# Patient Record
Sex: FEMALE | Race: WHITE | NOT HISPANIC OR LATINO | Employment: FULL TIME | URBAN - METROPOLITAN AREA
[De-identification: names, ages, dates, MRNs, and addresses within clinical notes are randomized per-mention and may not be internally consistent; named-entity substitution may affect disease eponyms.]

---

## 2023-08-15 LAB
ESTRADIOL (PG/ML) IN SER/PLAS: 22 PG/ML
FOLLITROPIN (IU/L) IN SER/PLAS: 47.3 IU/L

## 2024-10-01 ENCOUNTER — HOSPITAL ENCOUNTER (OUTPATIENT)
Dept: RADIOLOGY | Facility: EXTERNAL LOCATION | Age: 54
Discharge: HOME | End: 2024-10-01

## 2024-10-01 ENCOUNTER — HOSPITAL ENCOUNTER (OUTPATIENT)
Dept: RADIOLOGY | Facility: CLINIC | Age: 54
Discharge: HOME | End: 2024-10-01
Payer: COMMERCIAL

## 2024-10-01 ENCOUNTER — OFFICE VISIT (OUTPATIENT)
Dept: ORTHOPEDIC SURGERY | Facility: CLINIC | Age: 54
End: 2024-10-01
Payer: COMMERCIAL

## 2024-10-01 VITALS — WEIGHT: 252 LBS | HEIGHT: 62 IN | BODY MASS INDEX: 46.38 KG/M2

## 2024-10-01 DIAGNOSIS — M17.11 PRIMARY OSTEOARTHRITIS OF RIGHT KNEE: Primary | ICD-10-CM

## 2024-10-01 DIAGNOSIS — M25.561 ACUTE PAIN OF RIGHT KNEE: ICD-10-CM

## 2024-10-01 PROCEDURE — 73562 X-RAY EXAM OF KNEE 3: CPT | Mod: RIGHT SIDE | Performed by: RADIOLOGY

## 2024-10-01 PROCEDURE — 3008F BODY MASS INDEX DOCD: CPT | Performed by: FAMILY MEDICINE

## 2024-10-01 PROCEDURE — 20611 DRAIN/INJ JOINT/BURSA W/US: CPT | Performed by: FAMILY MEDICINE

## 2024-10-01 PROCEDURE — 1036F TOBACCO NON-USER: CPT | Performed by: FAMILY MEDICINE

## 2024-10-01 PROCEDURE — 99203 OFFICE O/P NEW LOW 30 MIN: CPT | Performed by: FAMILY MEDICINE

## 2024-10-01 PROCEDURE — 73562 X-RAY EXAM OF KNEE 3: CPT | Mod: RT

## 2024-10-01 RX ORDER — TRIAMCINOLONE ACETONIDE 40 MG/ML
40 INJECTION, SUSPENSION INTRA-ARTICULAR; INTRAMUSCULAR
Status: COMPLETED | OUTPATIENT
Start: 2024-10-01 | End: 2024-10-01

## 2024-10-01 RX ORDER — LIDOCAINE HYDROCHLORIDE 20 MG/ML
2 INJECTION, SOLUTION INFILTRATION; PERINEURAL
Status: COMPLETED | OUTPATIENT
Start: 2024-10-01 | End: 2024-10-01

## 2024-10-01 NOTE — PROGRESS NOTES
History of Present Illness   Chief Complaint   Patient presents with    Right Knee - Pain     +pain x 4 days   Gait was off d/t a left foot procedure 2 mo ago  swelling       The patient is 54 y.o. female  here with a complaint of right knee pain.  Onset of symptoms over the past 4 to 5 days.  Atraumatic in nature.  She attributes symptoms to abnormal gait secondary to left lower leg procedure 2 months ago.  She was having some sciatic symptoms as well, PCP put her on some prednisone which did help with this, a few days after completing prednisone she started having some pain over the medial aspect of her knee, symptoms over the medial knee exacerbated by walking, standing, stairs.  She feels like there is some mild swelling.  She has been using over-the-counter medications for pain.  She says at baseline she will get some intermittent more mild discomfort in both of her knees about never to this extent.  Patient works in food delivery    Past Medical History:   Diagnosis Date    Encounter for screening for malignant neoplasm of vagina     Vaginal Pap smear    Hyperlipidemia, unspecified 06/16/2014    Hyperlipidemia    Other conditions influencing health status     Menstruation    Other conditions influencing health status     History of dyspareunia    Personal history of other diseases of the circulatory system     History of hypertension    Personal history of other medical treatment     H/O mammogram    Personal history of other specified conditions     History of heartburn    Unspecified urinary incontinence     Incontinence       Medication Documentation Review Audit    **Prior to Admission medications have not yet been reviewed**         Allergies   Allergen Reactions    Clindamycin Itching    Penicillins Hives and Itching       Social History     Socioeconomic History    Marital status:      Spouse name: Not on file    Number of children: Not on file    Years of education: Not on file    Highest  education level: Not on file   Occupational History    Not on file   Tobacco Use    Smoking status: Never    Smokeless tobacco: Never   Substance and Sexual Activity    Alcohol use: Yes     Comment: rarely    Drug use: Never    Sexual activity: Not on file   Other Topics Concern    Not on file   Social History Narrative    Not on file     Social Determinants of Health     Financial Resource Strain: Not on file   Food Insecurity: Not on file   Transportation Needs: Not on file   Physical Activity: Not on file   Stress: Not on file   Social Connections: Not on file   Intimate Partner Violence: Not on file   Housing Stability: Not on file       Past Surgical History:   Procedure Laterality Date     SECTION, CLASSIC  2014     Section    COLONOSCOPY  2014    Complete Colonoscopy          Review of Systems   GENERAL: Negative  GI: Negative  MUSCULOSKELETAL: See HPI  SKIN: Negative  NEURO:  Negative     Physical Exam:    General/Constitutional: well appearing, no distress, appears stated age  HEENT: sclera clear  Respiratory: non labored breathing  Vascular: No edema, swelling or tenderness, except as noted in detailed exam.  Integumentary: No impressive skin lesions present, except as noted in detailed exam.  Neurological:  Alert and oriented   Psychological:  Normal mood and affect.  Musculoskeletal: Normal, except as noted in detailed exam and in HPI.  Antalgic gait, unassisted      Right knee: Normal appearance, no skin changes, no redness or warmth.  There is a trace joint effusion.  There is medial joint line tenderness to palpation, there is some tenderness at the medial tibia proximally.  No other areas of tenderness to palpation.  Range of motion from 0 to 115 degrees.  No pain or weakness with strength testing at the knee.  There is some discomfort with Felisha testing.  Negative Lachman, negative posterior drawer.  Stable varus and valgus stress.     Imaging x-rays of right knee  obtained today and independently reviewed, there is degenerative changes, moderate in the patellofemoral compartment, mild in the medial and lateral compartment, single AP view of the left knee shows moderate medial compartment degenerative changes.      L Inj/Asp: R knee on 10/1/2024 12:23 PM  Indications: pain  Details: 22 G needle, ultrasound-guided superolateral approach  Medications: 40 mg triamcinolone acetonide 40 mg/mL; 2 mL lidocaine 20 mg/mL (2 %)  Outcome: tolerated well, no immediate complications  Procedure, treatment alternatives, risks and benefits explained, specific risks discussed. Consent was given by the patient. Immediately prior to procedure a time out was called to verify the correct patient, procedure, equipment, support staff and site/side marked as required. Patient was prepped and draped in the usual sterile fashion.             Assessment   1. Primary osteoarthritis of right knee        2. Acute pain of right knee  XR knee right 3 views    Point of Care Ultrasound            Plan: Right knee pain thought to be aggravation of underlying osteoarthritis which is mild to moderate in severity more prominent in the patellofemoral compartment.  She does have medial joint line and medial tibial pain.  Discussed possibility of developing insufficiency fracture as potential cause of her pain.  Discussed further workup management.  We did proceed with ultrasound-guided right knee joint injection with Kenalog today, see procedure note for full details.  I will have patient follow-up in 2 weeks for reassessment.  She will avoid aggravating activities in the short-term.  May consider MRI of right knee if still having significant pain despite injection at follow-up visit.

## 2024-10-08 NOTE — PROGRESS NOTES
Subjective   Patient ID: Rosemary Condon is a 54 y.o. female who presents for PMB.     8-14-23 Mirena IUD removed. Hormones menopausal. No intercourse.    One week ago woke up and had spotting on pad. Lasted 2 days. At one time a little more flow but for a short time. Little cramping in the afternoon. Hot flashes are better now, more sporadic.    Seeing an orthopedic doctor for right knee pain. Arthritis. Has now had hip and groin pain. Had something removed from left foot and gait was off. Getting hip checked out tomorrow.      Review of Systems   Genitourinary:  Positive for vaginal bleeding.   All other systems reviewed and are negative.      Objective   Constitutional: Alert and in no acute distress. Well developed, well nourished.  Abdomen: soft nontender; no abdominal mass palpated, no organomegaly and no hernias.  Genitourinary: external genitalia: normal, no inguinal lymphadenopathy, Bartholin's urethral and [place]'s glands: normal, urethra: normal and bladder: normal on palpation.  Vagina: normal.  Cervix: normal.  Uterus: normal.   Right adnexa/parametria: normal.  Left adnexa/parametria: normal.  Psychiatric: alert and oriented x 3, affect normal to patient baseline and mood appropriate.     Assessment/Plan   -Pelvic ultrasound  -Check FSH E2.  -Follow up annual.

## 2024-10-09 ENCOUNTER — HOSPITAL ENCOUNTER (OUTPATIENT)
Dept: RADIOLOGY | Facility: CLINIC | Age: 54
Discharge: HOME | End: 2024-10-09
Payer: COMMERCIAL

## 2024-10-09 VITALS — BODY MASS INDEX: 46.38 KG/M2 | HEIGHT: 62 IN | WEIGHT: 252 LBS

## 2024-10-09 DIAGNOSIS — Z12.31 SCREENING MAMMOGRAM FOR BREAST CANCER: ICD-10-CM

## 2024-10-09 PROCEDURE — 77063 BREAST TOMOSYNTHESIS BI: CPT | Performed by: RADIOLOGY

## 2024-10-09 PROCEDURE — 77063 BREAST TOMOSYNTHESIS BI: CPT

## 2024-10-09 PROCEDURE — 77067 SCR MAMMO BI INCL CAD: CPT | Performed by: RADIOLOGY

## 2024-10-14 ENCOUNTER — APPOINTMENT (OUTPATIENT)
Dept: OBSTETRICS AND GYNECOLOGY | Facility: CLINIC | Age: 54
End: 2024-10-14
Payer: COMMERCIAL

## 2024-10-14 VITALS
SYSTOLIC BLOOD PRESSURE: 122 MMHG | WEIGHT: 257.6 LBS | DIASTOLIC BLOOD PRESSURE: 86 MMHG | BODY MASS INDEX: 47.41 KG/M2 | HEIGHT: 62 IN

## 2024-10-14 DIAGNOSIS — N95.0 POSTMENOPAUSAL BLEEDING: Primary | ICD-10-CM

## 2024-10-14 PROCEDURE — 3008F BODY MASS INDEX DOCD: CPT | Performed by: OBSTETRICS & GYNECOLOGY

## 2024-10-14 PROCEDURE — 1036F TOBACCO NON-USER: CPT | Performed by: OBSTETRICS & GYNECOLOGY

## 2024-10-14 PROCEDURE — 99213 OFFICE O/P EST LOW 20 MIN: CPT | Performed by: OBSTETRICS & GYNECOLOGY

## 2024-10-14 RX ORDER — ACETAMINOPHEN 500 MG
1 TABLET ORAL
COMMUNITY

## 2024-10-14 RX ORDER — ATORVASTATIN CALCIUM 40 MG/1
1 TABLET, FILM COATED ORAL DAILY
COMMUNITY
Start: 2016-06-16

## 2024-10-14 RX ORDER — LISINOPRIL AND HYDROCHLOROTHIAZIDE 10; 12.5 MG/1; MG/1
1 TABLET ORAL DAILY
COMMUNITY
Start: 2023-10-27

## 2024-10-14 RX ORDER — OMEPRAZOLE 40 MG/1
CAPSULE, DELAYED RELEASE ORAL
COMMUNITY
Start: 2018-01-15

## 2024-10-14 RX ORDER — FERROUS SULFATE 325(65) MG
1 TABLET ORAL
COMMUNITY

## 2024-10-14 RX ORDER — HYDROCHLOROTHIAZIDE 12.5 MG/1
TABLET ORAL
COMMUNITY
Start: 2023-07-11

## 2024-10-14 RX ORDER — CETIRIZINE HYDROCHLORIDE 10 MG/1
1 TABLET ORAL DAILY
COMMUNITY

## 2024-10-15 ENCOUNTER — HOSPITAL ENCOUNTER (OUTPATIENT)
Dept: RADIOLOGY | Facility: EXTERNAL LOCATION | Age: 54
Discharge: HOME | End: 2024-10-15

## 2024-10-15 ENCOUNTER — LAB (OUTPATIENT)
Dept: LAB | Facility: LAB | Age: 54
End: 2024-10-15
Payer: COMMERCIAL

## 2024-10-15 ENCOUNTER — APPOINTMENT (OUTPATIENT)
Dept: ORTHOPEDIC SURGERY | Facility: CLINIC | Age: 54
End: 2024-10-15
Payer: COMMERCIAL

## 2024-10-15 DIAGNOSIS — M17.11 PRIMARY OSTEOARTHRITIS OF RIGHT KNEE: ICD-10-CM

## 2024-10-15 DIAGNOSIS — M25.551 HIP PAIN, RIGHT: ICD-10-CM

## 2024-10-15 DIAGNOSIS — M70.61 TROCHANTERIC BURSITIS, RIGHT HIP: Primary | ICD-10-CM

## 2024-10-15 DIAGNOSIS — N95.0 POSTMENOPAUSAL BLEEDING: ICD-10-CM

## 2024-10-15 DIAGNOSIS — M25.561 ACUTE PAIN OF RIGHT KNEE: ICD-10-CM

## 2024-10-15 LAB
ESTRADIOL SERPL-MCNC: 24 PG/ML
FSH SERPL-ACNC: 44.1 IU/L

## 2024-10-15 PROCEDURE — 82670 ASSAY OF TOTAL ESTRADIOL: CPT

## 2024-10-15 PROCEDURE — 1036F TOBACCO NON-USER: CPT | Performed by: FAMILY MEDICINE

## 2024-10-15 PROCEDURE — 36415 COLL VENOUS BLD VENIPUNCTURE: CPT

## 2024-10-15 PROCEDURE — 20611 DRAIN/INJ JOINT/BURSA W/US: CPT | Performed by: FAMILY MEDICINE

## 2024-10-15 PROCEDURE — 99214 OFFICE O/P EST MOD 30 MIN: CPT | Performed by: FAMILY MEDICINE

## 2024-10-15 PROCEDURE — 83001 ASSAY OF GONADOTROPIN (FSH): CPT

## 2024-10-15 RX ORDER — LIDOCAINE HYDROCHLORIDE 20 MG/ML
2 INJECTION, SOLUTION INFILTRATION; PERINEURAL
Status: COMPLETED | OUTPATIENT
Start: 2024-10-15 | End: 2024-10-15

## 2024-10-15 RX ORDER — TRIAMCINOLONE ACETONIDE 40 MG/ML
40 INJECTION, SUSPENSION INTRA-ARTICULAR; INTRAMUSCULAR
Status: COMPLETED | OUTPATIENT
Start: 2024-10-15 | End: 2024-10-15

## 2024-10-15 NOTE — PROGRESS NOTES
History of Present Illness   Chief Complaint   Patient presents with    Right Knee - Follow-up     Pain and now with hip pain lateral side and groin x 5 days       The patient is 54 y.o. female  here for follow-up of right knee pain as well as some new onset right hip pain.  Patient was seen in me 2 weeks ago.  See previous note for full details.  In brief patient developed some atraumatic right knee pain around 5 days prior to our initial visit, thought symptoms were secondary to abnormal gait she developed after a lower leg procedure.  At our initial visit she was having some focal pain at the medial aspect of her knee, x-ray showed some mild degenerative changes in the medial compartment, moderate in the patellofemoral compartment.  We opted to treat with knee injection with Kenalog.  She says after a few days she did have good improvement in symptoms and was doing well, around a week ago she started developing some pain in the right hip over the lateral as well as anterior aspect of her hip which has persisted, she is also noticed over the past few days of recurrent pain in her right knee over the medial aspect of her knee.  Both symptoms are worse with walking, standing.  She does her hip pain is currently more bothersome than her knee pain but both are giving her trouble.  She admitted to some intermittent knee pain in the past, no history of any hip pain previously.        Past Medical History:   Diagnosis Date    Encounter for screening for malignant neoplasm of vagina     Vaginal Pap smear    Hyperlipidemia, unspecified 06/16/2014    Hyperlipidemia    Other conditions influencing health status     Menstruation    Other conditions influencing health status     History of dyspareunia    Personal history of other diseases of the circulatory system     History of hypertension    Personal history of other medical treatment     H/O mammogram    Personal history of other specified conditions     History of  heartburn    Unspecified urinary incontinence     Incontinence       Medication Documentation Review Audit       Reviewed by Mariana Rincon MD (Physician) on 10/14/24 at 1639      Medication Order Taking? Sig Documenting Provider Last Dose Status   atorvastatin (Lipitor) 40 mg tablet [id number] Yes Take 1 tablet (40 mg) by mouth once daily. Historical Provider, MD  Active   cetirizine (ZyrTEC) 10 mg tablet [id number] Yes Take 1 tablet (10 mg) by mouth once daily. Historical Provider, MD  Active   cholecalciferol (Vitamin D-3) 50 mcg (2,000 unit) capsule [id number] Yes 1 capsule (50 mcg). Historical Provider, MD  Active   FA-vit Bcomp-C-zinc-vitamin D3 0.8-2,000 mg-unit tablet [id number] Yes Take 1 tablet by mouth once daily. Historical Provider, MD  Active   ferrous sulfate, 325 mg ferrous sulfate, tablet [id number] Yes Take 1 tablet (325 mg) by mouth once daily. Historical Provider, MD  Active   hydroCHLOROthiazide (Microzide) 12.5 mg tablet [id number] Yes PLEASE SEE ATTACHED FOR DETAILED DIRECTIONS Historical Provider, MD  Active   iron-vit C-vit B12-folic acid [phone] mg-mg-mcg-mg tablet [id number] Yes Take by mouth every other day. Historical Provider, MD  Active   lisinopriL-hydrochlorothiazide 10-12.5 mg tablet [id number] Yes Take 1 tablet by mouth once daily. Historical Provider, MD  Active   omeprazole (PriLOSEC) 40 mg DR capsule [id number] Yes Take by mouth. Historical Provider, MD  Active                    Allergies   Allergen Reactions    Adhesive Unknown    Clindamycin Itching    Penicillins Hives and Itching       Social History     Socioeconomic History    Marital status:      Spouse name: Not on file    Number of children: Not on file    Years of education: Not on file    Highest education level: Not on file   Occupational History    Not on file   Tobacco Use    Smoking status: Never    Smokeless tobacco: Never   Vaping Use    Vaping status: Never Used   Substance and Sexual Activity    Alcohol use: Yes      Comment: rarely    Drug use: Never    Sexual activity: Not Currently   Other Topics Concern    Not on file   Social History Narrative    Not on file     Social Determinants of Health     Financial Resource Strain: Not on file   Food Insecurity: Not on file   Transportation Needs: Not on file   Physical Activity: Not on file   Stress: Not on file   Social Connections: Not on file   Intimate Partner Violence: Not on file   Housing Stability: Not on file       Past Surgical History:   Procedure Laterality Date     SECTION, CLASSIC  2014     Section    COLONOSCOPY  2014    Complete Colonoscopy          Review of Systems   GENERAL: Negative  GI: Negative  MUSCULOSKELETAL: See HPI  SKIN: Negative  NEURO:  Negative     Physical Exam:    General/Constitutional: well appearing, no distress, appears stated age  HEENT: sclera clear  Respiratory: non labored breathing  Vascular: No edema, swelling or tenderness, except as noted in detailed exam.  Integumentary: No impressive skin lesions present, except as noted in detailed exam.  Neurological:  Alert and oriented   Psychological:  Normal mood and affect.  Musculoskeletal: Normal, except as noted in detailed exam and in HPI.  Mildly antalgic gait, unassisted      Right knee: Normal appearance, no skin changes, no redness or warmth.  There is no joint effusion.  There is medial joint line tenderness to palpation, there is some tenderness at the medial tibia proximally.  No other areas of tenderness to palpation.  Range of motion from 0 to 115 degrees.  No pain or weakness with strength testing at the knee.  There is some discomfort with Felisha testing.  Negative Lachman, negative posterior drawer.  Stable varus and valgus stress.    Right hip: Normal appearance, no skin changes.  There is focal tenderness at the lateral hip near the greater trochanter and gluteus medius insertion, no anterior or posterior hip tenderness to palpation.  She has good  range of motion with flexion, internal and external rotation without exacerbation of pain, no motor deficits present at the hip, negative Stinchfield test.     Imaging: No new imaging today, previous knee x-rays showed degenerative changes, moderate in the patellofemoral compartment, mild in the medial and lateral compartment, single AP view of the left knee shows moderate medial compartment degenerative changes.      L Inj/Asp: R greater trochanteric bursa on 10/15/2024 11:45 AM  Indications: pain  Details: 22 G needle, ultrasound-guided lateral approach  Medications: 40 mg triamcinolone acetonide 40 mg/mL; 2 mL lidocaine 20 mg/mL (2 %)  Outcome: tolerated well, no immediate complications  Procedure, treatment alternatives, risks and benefits explained, specific risks discussed. Consent was given by the patient. Immediately prior to procedure a time out was called to verify the correct patient, procedure, equipment, support staff and site/side marked as required. Patient was prepped and draped in the usual sterile fashion.             Assessment   1. Trochanteric bursitis, right hip        2. Primary osteoarthritis of right knee  MR knee right wo IV contrast      3. Hip pain, right  Point of Care Ultrasound      4. Acute pain of right knee  MR knee right wo IV contrast            Plan:     Right hip pain thought to be secondary to trochanteric bursitis, discussed further workup and treatment.  We opted to hold off on x-rays today given lack of any signs of intra-articular hip pathology.  We did proceed with ultrasound-guided trochanteric bursa injection with Kenalog.  Patient tolerated without issue, see procedure note for full details.  Will follow-up as symptoms dictate for this  Right knee pain, short-term response to recent knee injection, continues to have some bony tenderness at the medial proximal tibia, there is some concern for possible insufficiency fracture.  Given ongoing pain here I would like to  obtain an MRI of her right knee to evaluate for stress/insufficiency fracture of the medial proximal tibia.  Did recommend that she avoid aggravating activities, avoid excess walking in the short-term until MRI is obtained.  She will continue with over-the-counter medications.  Further treatment pending MRI results.

## 2024-10-16 ENCOUNTER — HOSPITAL ENCOUNTER (OUTPATIENT)
Dept: RADIOLOGY | Facility: CLINIC | Age: 54
Discharge: HOME | End: 2024-10-16
Payer: COMMERCIAL

## 2024-10-16 DIAGNOSIS — N95.0 POSTMENOPAUSAL BLEEDING: ICD-10-CM

## 2024-10-16 PROCEDURE — 76830 TRANSVAGINAL US NON-OB: CPT | Performed by: RADIOLOGY

## 2024-10-16 PROCEDURE — 76856 US EXAM PELVIC COMPLETE: CPT

## 2024-10-16 PROCEDURE — 76856 US EXAM PELVIC COMPLETE: CPT | Performed by: RADIOLOGY

## 2024-10-30 ENCOUNTER — HOSPITAL ENCOUNTER (OUTPATIENT)
Dept: RADIOLOGY | Facility: CLINIC | Age: 54
Discharge: HOME | End: 2024-10-30
Payer: COMMERCIAL

## 2024-10-30 DIAGNOSIS — M17.11 PRIMARY OSTEOARTHRITIS OF RIGHT KNEE: ICD-10-CM

## 2024-10-30 DIAGNOSIS — M25.561 ACUTE PAIN OF RIGHT KNEE: ICD-10-CM

## 2024-10-30 PROCEDURE — 73721 MRI JNT OF LWR EXTRE W/O DYE: CPT | Mod: RT

## 2024-10-30 PROCEDURE — 73721 MRI JNT OF LWR EXTRE W/O DYE: CPT | Mod: RIGHT SIDE | Performed by: RADIOLOGY

## 2024-11-15 NOTE — PROGRESS NOTES
Subjective   Patient ID: Rosemary Condon is a 54 y.o. female who presents for Annual Exam.    PAP 8-14-23 NEG NEG; 4-1-21 ASCUS, HPV NEG  MAMMO 10-9-24  DEXA NEVER  COLON MARCH 2022-ONE POLYP precancerous-5 YEAR RETURN     Recent elevated WBC with PCP, repeating it one week. HCGAIC 7.5, starting on Metformin.     Mirena IUD removed 8/2023. Seen for PMB(10/8/24) 10/14/24. Endometrium 2mm. U/S done 10/16/24. On 10/30/24 spotting heavy spotting, 2 1/2 days.     Aaron epilepsy, on meds. Son working at a law firm as a .     Works with [employer] for [employer]. Works from home.    Review of Systems   Genitourinary:  Positive for vaginal bleeding.   All other systems reviewed and are negative.      Objective   Constitutional: Alert and in no acute distress. Well developed, well nourished.  Neck: no neck asymmetry. Supple and thyroid not enlarged and there were no palpable thyroid nodules.  Chest: Breasts normal appearance, no nipple discharge and no skin changes and palpation of breasts and axillae: no palpable mass and no axillary lymphadenopathy.  Abdomen: soft nontender; no abdominal mass palpated, no organomegaly and no hernias.  Genitourinary: external genitalia: normal, no inguinal lymphadenopathy, Bartholin's urethral and [place]'s glands: normal, urethra: normal and bladder: normal on palpation.  Vagina: normal.  Cervix: normal.  Uterus: normal.   Right adnexa/parametria: normal.  Left adnexa/parametria: normal.  Skin: normal skin color and pigmentation, normal skin turgor and no rash.  Psychiatric: alert and oriented x 3, affect normal to patient baseline and mood appropriate.     Assessment/Plan   -no pap  -isabell-kaiden  -EMB today. Patient agrees to do after second episode of PMB.    Patient ID: Rosemary Condon is a 54 y.o. female.    Endometrial biopsy    Date/Time: 11/18/2024 4:09 PM    Performed by: Mariana Rincon MD  Authorized by: Mariana Rincon MD    Consent:     Consent obtained: written     Consent given by: patient    Risks discussed: bleeding and infection  Indications:     Indications: postmenopausal bleeding      Chronicity of post-menopausal bleeding: recurrent  Procedure:     A bimanual exam was performed: yes      Uterus size: 9-10 weeks    Uterus position: midposition    Prepped with: Betadine    Tenaculum used: yes      A local block was performed: yes      Local anesthetic: lidocaine 1% w/o epi    Amount used (mL): 3    Cervix dilated: no      Number of passes: 3  Findings:     Cervix: normal      Uterus depth by sound (cm): 12    Specimen collected: specimen collected and sent to pathology      Patient tolerance: tolerated well, no immediate complications  Comments:     Procedure comments: Scant tissue with 3 passes. Patient got a little lightheaded but it then resolved. She was given water and asked to rest afterward.

## 2024-11-18 ENCOUNTER — APPOINTMENT (OUTPATIENT)
Dept: OBSTETRICS AND GYNECOLOGY | Facility: CLINIC | Age: 54
End: 2024-11-18
Payer: COMMERCIAL

## 2024-11-18 VITALS
DIASTOLIC BLOOD PRESSURE: 80 MMHG | BODY MASS INDEX: 47.77 KG/M2 | HEIGHT: 61 IN | WEIGHT: 253 LBS | SYSTOLIC BLOOD PRESSURE: 132 MMHG

## 2024-11-18 DIAGNOSIS — N95.0 PMB (POSTMENOPAUSAL BLEEDING): ICD-10-CM

## 2024-11-18 DIAGNOSIS — Z12.31 ENCOUNTER FOR SCREENING MAMMOGRAM FOR MALIGNANT NEOPLASM OF BREAST: Primary | ICD-10-CM

## 2024-11-18 PROCEDURE — 88305 TISSUE EXAM BY PATHOLOGIST: CPT | Performed by: PATHOLOGY

## 2024-11-18 PROCEDURE — 99396 PREV VISIT EST AGE 40-64: CPT | Performed by: OBSTETRICS & GYNECOLOGY

## 2024-11-18 PROCEDURE — 1036F TOBACCO NON-USER: CPT | Performed by: OBSTETRICS & GYNECOLOGY

## 2024-11-18 PROCEDURE — 88305 TISSUE EXAM BY PATHOLOGIST: CPT

## 2024-11-18 PROCEDURE — 3008F BODY MASS INDEX DOCD: CPT | Performed by: OBSTETRICS & GYNECOLOGY

## 2024-11-18 PROCEDURE — 58100 BIOPSY OF UTERUS LINING: CPT | Performed by: OBSTETRICS & GYNECOLOGY

## 2024-11-26 LAB
LABORATORY COMMENT REPORT: NORMAL
PATH REPORT.FINAL DX SPEC: NORMAL
PATH REPORT.GROSS SPEC: NORMAL
PATH REPORT.RELEVANT HX SPEC: NORMAL
PATH REPORT.TOTAL CANCER: NORMAL
RESIDENT REVIEW: NORMAL

## 2024-12-02 ENCOUNTER — HOSPITAL ENCOUNTER (OUTPATIENT)
Dept: RADIOLOGY | Facility: EXTERNAL LOCATION | Age: 54
Discharge: HOME | End: 2024-12-02

## 2024-12-02 ENCOUNTER — APPOINTMENT (OUTPATIENT)
Dept: ORTHOPEDIC SURGERY | Facility: CLINIC | Age: 54
End: 2024-12-02
Payer: COMMERCIAL

## 2024-12-02 DIAGNOSIS — M25.552 LEFT HIP PAIN: ICD-10-CM

## 2024-12-02 DIAGNOSIS — M70.62 TROCHANTERIC BURSITIS, LEFT HIP: ICD-10-CM

## 2024-12-02 DIAGNOSIS — M84.461A INSUFFICIENCY FRACTURE OF RIGHT TIBIA: Primary | ICD-10-CM

## 2024-12-02 DIAGNOSIS — M17.11 PRIMARY OSTEOARTHRITIS OF RIGHT KNEE: ICD-10-CM

## 2024-12-02 PROCEDURE — 99214 OFFICE O/P EST MOD 30 MIN: CPT | Performed by: FAMILY MEDICINE

## 2024-12-02 PROCEDURE — 20611 DRAIN/INJ JOINT/BURSA W/US: CPT | Performed by: FAMILY MEDICINE

## 2024-12-02 PROCEDURE — 1036F TOBACCO NON-USER: CPT | Performed by: FAMILY MEDICINE

## 2024-12-02 RX ORDER — LIDOCAINE HYDROCHLORIDE 20 MG/ML
2 INJECTION, SOLUTION INFILTRATION; PERINEURAL
Status: COMPLETED | OUTPATIENT
Start: 2024-12-02 | End: 2024-12-02

## 2024-12-02 RX ORDER — TRIAMCINOLONE ACETONIDE 40 MG/ML
40 INJECTION, SUSPENSION INTRA-ARTICULAR; INTRAMUSCULAR
Status: COMPLETED | OUTPATIENT
Start: 2024-12-02 | End: 2024-12-02

## 2024-12-02 NOTE — PROGRESS NOTES
History of Present Illness   Chief Complaint   Patient presents with    Right Knee - Follow-up     Discuss mri       The patient is 54 y.o. female  here for follow-up of right knee pain as well as some new onset left hip pain.  Patient was last seen by me approximately 6 weeks ago.  See previous note for full details.  In brief patient was initially having some atraumatic right knee pain, symptoms thought to be aggravation of some mild osteoarthritis.  She was treated with knee joint injection with few days of relief but then developed some worsening pain in her right knee.  Symptoms at that time were concerning for possible developing insufficiency fracture, recommended that she minimize weightbearing, MRI was ordered, she was also having some right lateral hip pain consistent with trochanteric bursitis that was treated with right hip  bursa injection, says right hip pain is doing well.  MRI did confirm insufficiency fracture of the proximal medial tibia, we discussed results via MyChart, I do recommend that she continue with modified weightbearing and follow-up today for further evaluation.  She says overall knee is doing well, some minimal residual discomfort in the knee.  She admits to some new pain in the left lateral hip over the past 1 to 2 weeks, reminds her of the bursa pain on her right hip that responded well to injection.  She attributes symptoms to some alteration in her gait, she has been using a cane to assist in ambulation.  She denies any history of any left hip problems in the past.        Past Medical History:   Diagnosis Date    Encounter for screening for malignant neoplasm of vagina     Vaginal Pap smear    Hyperlipidemia, unspecified 06/16/2014    Hyperlipidemia    Other conditions influencing health status     Menstruation    Other conditions influencing health status     History of dyspareunia    Personal history of other diseases of the circulatory system     History of hypertension     Personal history of other medical treatment     H/O mammogram    Personal history of other specified conditions     History of heartburn    Unspecified urinary incontinence     Incontinence       Medication Documentation Review Audit       Reviewed by Mariana Rincon MD (Physician) on 11/18/24 at 1536      Medication Order Taking? Sig Documenting Provider Last Dose Status   atorvastatin (Lipitor) 40 mg tablet [id number]  Take 1 tablet (40 mg) by mouth once daily. Historical Provider, MD  Active   cetirizine (ZyrTEC) 10 mg tablet [id number]  Take 1 tablet (10 mg) by mouth once daily. Historical Provider, MD  Active   cholecalciferol (Vitamin D-3) 50 mcg (2,000 unit) capsule [id number]  1 capsule (50 mcg). Historical Provider, MD  Active   FA-vit Bcomp-C-zinc-vitamin D3 0.8-2,000 mg-unit tablet [id number]  Take 1 tablet by mouth once daily. Historical Provider, MD  Active   ferrous sulfate, 325 mg ferrous sulfate, tablet [id number]  Take 1 tablet (325 mg) by mouth once daily. Historical Provider, MD  Active   hydroCHLOROthiazide (Microzide) 12.5 mg tablet [id number]  PLEASE SEE ATTACHED FOR DETAILED DIRECTIONS Historical Provider, MD  Active   iron-vit C-vit B12-folic acid [phone] mg-mg-mcg-mg tablet [id number]  Take by mouth every other day. Historical Provider, MD  Active   lisinopriL-hydrochlorothiazide 10-12.5 mg tablet [id number]  Take 1 tablet by mouth once daily. Historical Provider, MD  Active   omeprazole (PriLOSEC) 40 mg DR capsule [id number]  Take by mouth. Historical Provider, MD  Active                    Allergies   Allergen Reactions    Adhesive Unknown    Clindamycin Itching    Penicillins Hives and Itching       Social History     Socioeconomic History    Marital status:      Spouse name: Not on file    Number of children: Not on file    Years of education: Not on file    Highest education level: Not on file   Occupational History    Not on file   Tobacco Use    Smoking status: Never    Smokeless tobacco:  Never   Vaping Use    Vaping status: Never Used   Substance and Sexual Activity    Alcohol use: Yes     Comment: rarely    Drug use: Never    Sexual activity: Not Currently   Other Topics Concern    Not on file   Social History Narrative    Not on file     Social Drivers of Health     Financial Resource Strain: Low Risk  (10/16/2024)    Received from [facility]    Overall Financial Resource Strain (CARDIA)     Difficulty of Paying Living Expenses: Not hard at all   Food Insecurity: No Food Insecurity (10/16/2024)    Received from [facility]    Hunger Vital Sign     Worried About Running Out of Food in the Last Year: Never true     Ran Out of Food in the Last Year: Never true   Transportation Needs: No Transportation Needs (10/16/2024)    Received from [facility]    PRAPARE - Transportation     Lack of Transportation (Medical): No     Lack of Transportation (Non-Medical): No   Physical Activity: Sufficiently Active (10/16/2024)    Received from [facility]    Exercise Vital Sign     Days of Exercise per Week: 5 days     Minutes of Exercise per Session: 30 min   Stress: No Stress Concern Present (10/16/2024)    Received from [facility]    [employer] [place] [employer] - Occupational Stress Questionnaire     Feeling of Stress : Not at all   Social Connections: Moderately Integrated (10/16/2024)    Received from [facility]    Social Connection and Isolation Panel [NHANES]     Frequency of Communication with Friends and Family: More than three times a week     Frequency of Social Gatherings with Friends and Family: Once a week     Attends Sabianist Services: More than 4 times per year     Active Member of Clubs or Organizations: No     Attends Club or Organization Meetings: Never     Marital Status:    Intimate Partner Violence: Not on file   Housing Stability: Unknown (10/16/2024)    Received from [facility]    Housing Stability Vital Sign     Unable to Pay for  Housing in the Last Year: No     Number of Times Moved in the Last Year: Not on file     Homeless in the Last Year: Not on file       Past Surgical History:   Procedure Laterality Date     SECTION, CLASSIC  2014     Section    COLONOSCOPY  2014    Complete Colonoscopy          Review of Systems   GENERAL: Negative  GI: Negative  MUSCULOSKELETAL: See HPI  SKIN: Negative  NEURO:  Negative     Physical Exam:    General/Constitutional: well appearing, no distress, appears stated age  HEENT: sclera clear  Respiratory: non labored breathing  Vascular: No edema, swelling or tenderness, except as noted in detailed exam.  Integumentary: No impressive skin lesions present, except as noted in detailed exam.  Neurological:  Alert and oriented   Psychological:  Normal mood and affect.  Musculoskeletal: Normal, except as noted in detailed exam and in HPI.  Mildly antalgic gait, unassisted      Right knee: Normal appearance, no skin changes, no redness or warmth.  There is no joint effusion.  Very mild residual tenderness at the medial joint line and medial proximal tibia.  No other areas of tenderness to palpation.  Range of motion from 0 to 115 degrees.  No pain or weakness with strength testing at the knee.  Negative Felisha, negative Lachman, negative posterior drawer.  Stable varus and valgus stress.    Left hip: Normal appearance, no skin changes.  There is focal tenderness at the lateral hip near the greater trochanter and gluteus medius insertion, no anterior or posterior hip tenderness to palpation.  She has good range of motion with flexion, internal and external rotation without exacerbation of pain, no motor deficits present at the hip, negative Stinchfield test.     Imaging: MRI of right knee was reviewed with patient including images, there is evidence of a medial tibia insufficiency fracture as well as some more moderate medial compartment degenerative changes and posterior medial  meniscus tear      L Inj/Asp: L greater trochanteric bursa on 12/2/2024 4:16 PM  Indications: pain  Details: 22 G needle, ultrasound-guided lateral approach  Medications: 40 mg triamcinolone acetonide 40 mg/mL; 2 mL lidocaine 20 mg/mL (2 %)  Outcome: tolerated well, no immediate complications  Procedure, treatment alternatives, risks and benefits explained, specific risks discussed. Consent was given by the patient. Immediately prior to procedure a time out was called to verify the correct patient, procedure, equipment, support staff and site/side marked as required. Patient was prepped and draped in the usual sterile fashion.             Assessment   1. Insufficiency fracture of right tibia        2. Primary osteoarthritis of right knee        3. Trochanteric bursitis, left hip        4. Left hip pain  Point of Care Ultrasound            Plan:     Right proximal tibia insufficiency fracture, doing well with conservative treatment, very minimal residual tenderness at medial joint line and medial proximal tibia, she will continue with conservative management, activity modifications.  Follow-up in 6 weeks for reassessment  Left hip trochanteric bursitis: Similar symptoms to right hip 6 weeks ago with good response to bursa injection, we did proceed with ultrasound-guided left hip trochanteric bursa injection with Kenalog, patient tolerated without issue, see procedure note for full details.  Follow-up in 6 weeks.

## 2025-03-10 ENCOUNTER — HOSPITAL ENCOUNTER (OUTPATIENT)
Dept: RADIOLOGY | Facility: EXTERNAL LOCATION | Age: 55
Discharge: HOME | End: 2025-03-10

## 2025-03-10 ENCOUNTER — APPOINTMENT (OUTPATIENT)
Dept: ORTHOPEDIC SURGERY | Facility: CLINIC | Age: 55
End: 2025-03-10
Payer: COMMERCIAL

## 2025-03-10 VITALS — HEIGHT: 62 IN | BODY MASS INDEX: 46.38 KG/M2 | WEIGHT: 252 LBS

## 2025-03-10 DIAGNOSIS — M17.11 PRIMARY OSTEOARTHRITIS OF RIGHT KNEE: ICD-10-CM

## 2025-03-10 DIAGNOSIS — M70.61 TROCHANTERIC BURSITIS, RIGHT HIP: ICD-10-CM

## 2025-03-10 DIAGNOSIS — M25.552 LEFT HIP PAIN: ICD-10-CM

## 2025-03-10 DIAGNOSIS — M70.62 TROCHANTERIC BURSITIS, LEFT HIP: ICD-10-CM

## 2025-03-10 DIAGNOSIS — M17.12 PRIMARY OSTEOARTHRITIS OF LEFT KNEE: Primary | ICD-10-CM

## 2025-03-10 DIAGNOSIS — M25.562 ACUTE PAIN OF LEFT KNEE: ICD-10-CM

## 2025-03-10 PROCEDURE — 3008F BODY MASS INDEX DOCD: CPT | Performed by: FAMILY MEDICINE

## 2025-03-10 PROCEDURE — 1036F TOBACCO NON-USER: CPT | Performed by: FAMILY MEDICINE

## 2025-03-10 PROCEDURE — 20611 DRAIN/INJ JOINT/BURSA W/US: CPT | Performed by: FAMILY MEDICINE

## 2025-03-10 PROCEDURE — 99214 OFFICE O/P EST MOD 30 MIN: CPT | Performed by: FAMILY MEDICINE

## 2025-03-10 RX ORDER — LIDOCAINE HYDROCHLORIDE 20 MG/ML
2 INJECTION, SOLUTION INFILTRATION; PERINEURAL
Status: COMPLETED | OUTPATIENT
Start: 2025-03-10 | End: 2025-03-10

## 2025-03-10 RX ORDER — TRIAMCINOLONE ACETONIDE 40 MG/ML
40 INJECTION, SUSPENSION INTRA-ARTICULAR; INTRAMUSCULAR
Status: COMPLETED | OUTPATIENT
Start: 2025-03-10 | End: 2025-03-10

## 2025-03-10 RX ADMIN — TRIAMCINOLONE ACETONIDE 40 MG: 40 INJECTION, SUSPENSION INTRA-ARTICULAR; INTRAMUSCULAR at 12:22

## 2025-03-10 RX ADMIN — LIDOCAINE HYDROCHLORIDE 2 ML: 20 INJECTION, SOLUTION INFILTRATION; PERINEURAL at 12:22

## 2025-03-10 NOTE — PROGRESS NOTES
History of Present Illness   Chief Complaint   Patient presents with    Right Knee - Follow-up    Left Hip - Follow-up    Left Knee - Pain       The patient is 54 y.o. female  here for follow-up of right knee pain and left hip pain as well as some new onset left knee pain.  See previous notes for full details.  With regards to her right knee, she was diagnosed with insufficiency fracture in October, x-rays and MRI did also show some moderate degenerative changes, treatment was conservative, did have injection prior to MRI which was of short-term benefit by modified weightbearing led to good improvement in right knee was doing well at her most recent visit, she has had recent recurrence of right knee pain exacerbated by walking, standing, stairs.  Patient also having some new onset left knee pain over the past month or 2, seems to be bothering her more than her right knee currently.  Similar type of discomfort compared to the right, no swelling.  Previous x-rays of the left knee did show some moderate degenerative changes.    With regards to her left hip patient was diagnosed with greater trochanteric pain syndrome, initially thought to be aggravated by altered gait because of her right knee.  She did have a left hip trochanteric bursa injection with Kenalog a little over 3 months ago that was of good benefit with more recent recurrence of pain.  She has also had a right hip trochanteric bursa injection in early October that has provided more lasting relief compared to her left, she has had no recurrence of right lateral hip pain.            Past Medical History:   Diagnosis Date    Encounter for screening for malignant neoplasm of vagina     Vaginal Pap smear    Hyperlipidemia, unspecified 06/16/2014    Hyperlipidemia    Other conditions influencing health status     Menstruation    Other conditions influencing health status     History of dyspareunia    Personal history of other diseases of the circulatory system      History of hypertension    Personal history of other medical treatment     H/O mammogram    Personal history of other specified conditions     History of heartburn    Unspecified urinary incontinence     Incontinence       Medication Documentation Review Audit       Reviewed by Sohail Berry MD (Physician) on 03/10/25 at 1223      Medication Order Taking? Sig Documenting Provider Last Dose Status   atorvastatin (Lipitor) 40 mg tablet [id number]  Take 1 tablet (40 mg) by mouth once daily. Historical Provider, MD  Active   cetirizine (ZyrTEC) 10 mg tablet [id number]  Take 1 tablet (10 mg) by mouth once daily. Historical Provider, MD  Active   cholecalciferol (Vitamin D-3) 50 mcg (2,000 unit) capsule [id number]  1 capsule (50 mcg). Historical Provider, MD  Active   FA-vit Bcomp-C-zinc-vitamin D3 0.8-2,000 mg-unit tablet [id number]  Take 1 tablet by mouth once daily. Historical Provider, MD  Active   ferrous sulfate, 325 mg ferrous sulfate, tablet [id number]  Take 1 tablet (325 mg) by mouth once daily. Historical Provider, MD  Active   hydroCHLOROthiazide (Microzide) 12.5 mg tablet [id number]  PLEASE SEE ATTACHED FOR DETAILED DIRECTIONS Historical Provider, MD  Active   iron-vit C-vit B12-folic acid [phone] mg-mg-mcg-mg tablet [id number]  Take by mouth every other day. Historical Provider, MD  Active   lisinopriL-hydrochlorothiazide 10-12.5 mg tablet [id number]  Take 1 tablet by mouth once daily. Historical Provider, MD  Active   omeprazole (PriLOSEC) 40 mg DR capsule [id number]  Take by mouth. Historical Provider, MD  Active                    Allergies   Allergen Reactions    Adhesive Unknown    Clindamycin Itching    Penicillins Hives and Itching       Social History     Socioeconomic History    Marital status:      Spouse name: Not on file    Number of children: Not on file    Years of education: Not on file    Highest education level: Not on file   Occupational History    Not on file   Tobacco Use    Smoking status:  Never    Smokeless tobacco: Never   Vaping Use    Vaping status: Never Used   Substance and Sexual Activity    Alcohol use: Yes     Comment: rarely    Drug use: Never    Sexual activity: Not Currently   Other Topics Concern    Not on file   Social History Narrative    Not on file     Social Drivers of Health     Financial Resource Strain: Low Risk  (10/16/2024)    Received from [facility]    Overall Financial Resource Strain (CARDIA)     Difficulty of Paying Living Expenses: Not hard at all   Food Insecurity: No Food Insecurity (10/16/2024)    Received from [facility]    Hunger Vital Sign     Worried About Running Out of Food in the Last Year: Never true     Ran Out of Food in the Last Year: Never true   Transportation Needs: No Transportation Needs (10/16/2024)    Received from [facility]    PRAPARE - Transportation     Lack of Transportation (Medical): No     Lack of Transportation (Non-Medical): No   Physical Activity: Sufficiently Active (10/16/2024)    Received from [facility]    Exercise Vital Sign     Days of Exercise per Week: 5 days     Minutes of Exercise per Session: 30 min   Stress: No Stress Concern Present (10/16/2024)    Received from [facility]    [employer] [place] [employer] - Occupational Stress Questionnaire     Feeling of Stress : Not at all   Social Connections: Moderately Integrated (10/16/2024)    Received from [facility]    Social Connection and Isolation Panel [NHANES]     Frequency of Communication with Friends and Family: More than three times a week     Frequency of Social Gatherings with Friends and Family: Once a week     Attends Gnosticist Services: More than 4 times per year     Active Member of Clubs or Organizations: No     Attends Club or Organization Meetings: Never     Marital Status:    Intimate Partner Violence: Not on file   Housing Stability: Unknown (10/16/2024)    Received from [facility]    Housing Stability Vital  Sign     Unable to Pay for Housing in the Last Year: No     Number of Times Moved in the Last Year: Not on file     Homeless in the Last Year: Not on file       Past Surgical History:   Procedure Laterality Date     SECTION, CLASSIC  2014     Section    COLONOSCOPY  2014    Complete Colonoscopy          Review of Systems   GENERAL: Negative  GI: Negative  MUSCULOSKELETAL: See HPI  SKIN: Negative  NEURO:  Negative     Physical Exam:    General/Constitutional: well appearing, no distress, appears stated age  HEENT: sclera clear  Respiratory: non labored breathing  Vascular: No edema, swelling or tenderness, except as noted in detailed exam.  Integumentary: No impressive skin lesions present, except as noted in detailed exam.  Neurological:  Alert and oriented   Psychological:  Normal mood and affect.  Musculoskeletal: Normal, except as noted in detailed exam and in HPI.  Mildly antalgic gait, unassisted      Right knee: Normal appearance, no skin changes, no redness or warmth.  There is no joint effusion.  Very mild residual tenderness at the medial joint line and medial proximal tibia.  No other areas of tenderness to palpation.  Range of motion from 0 to 115 degrees.  No pain or weakness with strength testing at the knee.  Negative Felisha, negative Lachman, negative posterior drawer.  Stable varus and valgus stress.    Left knee: Normal appearance, no swelling, no skin changes, no joint effusion.  There is some medial joint line tenderness, no significant bony tenderness.  She has good range of motion from 0 to 120 degrees.  No motor deficits present at the knee.  No gross ligamentous laxity.    Left hip: Normal appearance, no skin changes.  There is focal tenderness at the lateral hip near the greater trochanter and gluteus medius insertion, no anterior or posterior hip tenderness to palpation.  She has good range of motion with flexion, internal and external rotation without  exacerbation of pain, no motor deficits present at the hip, negative Stinchfield test.     Imaging: No new imaging today      L Inj/Asp: L knee on 3/10/2025 12:22 PM  Indications: pain  Details: 22 G needle, ultrasound-guided superolateral approach  Medications: 40 mg triamcinolone acetonide 40 mg/mL; 2 mL lidocaine 20 mg/mL (2 %)  Outcome: tolerated well, no immediate complications    Left knee joint and surrounding structures were appropriately visualized before and during injection    Ultrasound images were permanently uploaded to the medical record/PACS  Procedure, treatment alternatives, risks and benefits explained, specific risks discussed. Consent was given by the patient. Immediately prior to procedure a time out was called to verify the correct patient, procedure, equipment, support staff and site/side marked as required. Patient was prepped and draped in the usual sterile fashion.       L Inj/Asp: L greater trochanteric bursa on 3/10/2025 12:22 PM  Indications: pain  Details: 22 G needle, ultrasound-guided lateral approach  Medications: 40 mg triamcinolone acetonide 40 mg/mL; 2 mL lidocaine 20 mg/mL (2 %)  Outcome: tolerated well, no immediate complications    Left hip bursa and surrounding structures were appropriately visualized before and during injection    Ultrasound images were permanently uploaded to the medical record/PACS  Procedure, treatment alternatives, risks and benefits explained, specific risks discussed. Consent was given by the patient. Immediately prior to procedure a time out was called to verify the correct patient, procedure, equipment, support staff and site/side marked as required. Patient was prepped and draped in the usual sterile fashion.             Assessment   1. Primary osteoarthritis of left knee  Referral to Physical Therapy      2. Trochanteric bursitis, left hip  Referral to Physical Therapy      3. Primary osteoarthritis of right knee  Referral to Physical Therapy       4. Trochanteric bursitis, right hip  Referral to Physical Therapy      5. Left hip pain  Point of Care Ultrasound    Referral to Physical Therapy      6. Acute pain of left knee  Referral to Physical Therapy            Plan:     Bilateral knee pain, left greater than right secondary to underlying osteoarthritis, moderate in severity on previous x-rays.  Discussed further workup and treatment.  We did proceed with ultrasound-guided left knee joint injection today, she tolerated without issue, see procedure note for full details.  We opted to hold off on right knee pain given more mild discomfort as well as plans for left hip injection today, did not want to do 3 large joint injections.  Can follow-up if right knee pain is ongoing following injection of her left knee over the next few weeks.  I did place order for physical therapy for both bilateral knees and bilateral hips.  She will call to schedule.  Left hip pain/trochanteric bursitis, good but short-term response to left lateral hip injection 4 months ago with recent recurrence of pain.  Discussed further workup and treatment.  We did proceed with repeat Kenalog injection under ultrasound guidance at the trochanteric bursa.  We discussed role of physical therapy and this situation with likely some gluteal medial tendinopathy contributing.  Order was placed, she will call to schedule.  She will follow-up as symptoms dictate.

## 2025-08-22 ENCOUNTER — HOSPITAL ENCOUNTER (OUTPATIENT)
Dept: RADIOLOGY | Facility: EXTERNAL LOCATION | Age: 55
Discharge: HOME | End: 2025-08-22

## 2025-08-22 ENCOUNTER — APPOINTMENT (OUTPATIENT)
Dept: ORTHOPEDIC SURGERY | Facility: CLINIC | Age: 55
End: 2025-08-22
Payer: COMMERCIAL

## 2025-08-22 DIAGNOSIS — M70.62 TROCHANTERIC BURSITIS, LEFT HIP: ICD-10-CM

## 2025-08-22 DIAGNOSIS — M25.552 LEFT HIP PAIN: ICD-10-CM

## 2025-08-22 DIAGNOSIS — M25.562 ACUTE PAIN OF LEFT KNEE: ICD-10-CM

## 2025-08-22 DIAGNOSIS — M17.12 PRIMARY OSTEOARTHRITIS OF LEFT KNEE: Primary | ICD-10-CM

## 2025-08-22 PROCEDURE — 20611 DRAIN/INJ JOINT/BURSA W/US: CPT | Performed by: FAMILY MEDICINE

## 2025-08-22 PROCEDURE — 1036F TOBACCO NON-USER: CPT | Performed by: FAMILY MEDICINE

## 2025-08-22 PROCEDURE — 99214 OFFICE O/P EST MOD 30 MIN: CPT | Performed by: FAMILY MEDICINE

## 2025-08-22 RX ORDER — TRIAMCINOLONE ACETONIDE 40 MG/ML
40 INJECTION, SUSPENSION INTRA-ARTICULAR; INTRAMUSCULAR
Status: COMPLETED | OUTPATIENT
Start: 2025-08-22 | End: 2025-08-22

## 2025-08-22 RX ORDER — LIDOCAINE HYDROCHLORIDE 20 MG/ML
2 INJECTION, SOLUTION INFILTRATION; PERINEURAL
Status: COMPLETED | OUTPATIENT
Start: 2025-08-22 | End: 2025-08-22

## 2025-08-22 RX ADMIN — TRIAMCINOLONE ACETONIDE 40 MG: 40 INJECTION, SUSPENSION INTRA-ARTICULAR; INTRAMUSCULAR at 10:27

## 2025-08-22 RX ADMIN — LIDOCAINE HYDROCHLORIDE 2 ML: 20 INJECTION, SOLUTION INFILTRATION; PERINEURAL at 10:27
